# Patient Record
(demographics unavailable — no encounter records)

---

## 2024-10-23 NOTE — CARDIOLOGY SUMMARY
[de-identified] : 10/22/24 NSR, ant septal inf   [de-identified] : 04/10/22\par  Mitral Valve: Normal mitral valve. Minimal mitral\par  regurgitation.\par  Aortic Valve/Aorta: Aortic valve leaflet morphology not\par  well visualized.\par  Normal aortic root size. (Ao: 3.4 cm at the sinuses of\par  Valsalva).\par  Left Atrium: Normal left atrium.  LA volume index = 24\par  cc/m2.\par  Left Ventricle: Mild to moderate segmental left ventricular\par  systolic dysfunction.  Hypokinesis of the apex, distal\par  septum, and mid to distal anterior wall.  Endocardial\par  visualization enhanced with intravenous injection of\par  Ultrasonic Enhancing Agent (Definity).   No LV thrombus\par  seen. Normal left ventricular internal dimensions and wall\par  thicknesses. Moderate diastolic dysfunction (Stage II).\par  Right Heart: Normal right atrium. Unable to accurately\par  evaluate right ventricular size or systolic function.\par  Normal tricuspid valve. Minimal tricuspid regurgitation.\par  Normal pulmonic valve.\par  Pericardium/Pleura: Normal pericardium with no pericardial\par  effusion.\par  Hemodynamic: Estimated right atrial pressure is 8 mm Hg.\par   [de-identified] : 04/10/22\par  Coronary Angiography \par  The coronary circulation is right dominant. Cardiac catheterization\par  was performed emergently.\par  \par  LM \par  Left main artery: Angiography shows minor irregularities.  \par  \par  LAD \par  Left anterior descending artery: Angiography shows mild\par  atherosclerosis. First diagonal: There is a 95 % stenosis. This is the\par  culprit lesion. An intervention was performed. \par  \par  CX \par  Circumflex: Angiography shows minor irregularities.  \par  \par  RCA \par  Right coronary artery: Angiography shows mild atherosclerosis.  \par  \par  Left Heart Cath \par  Ejection fraction is 45%.  \par  \par

## 2024-10-23 NOTE — REVIEW OF SYSTEMS
[SOB] : shortness of breath [Dyspnea on exertion] : not dyspnea during exertion [Chest Discomfort] : no chest discomfort [Lower Ext Edema] : no extremity edema [Palpitations] : no palpitations [Syncope] : no syncope [Cough] : no cough [Wheezing] : no wheezing [Coughing Up Blood] : no hemoptysis [Abdominal Pain] : no abdominal pain [Nausea] : no nausea [Vomiting] : no vomiting [Diarrhea] : diarrhea [Hematuria] : no hematuria [Pain During Urination] : no dysuria [Easy Bruising] : a tendency for easy bruising [Negative] : Psychiatric

## 2024-10-23 NOTE — HISTORY OF PRESENT ILLNESS
[FreeTextEntry1] : 50 yo man with MI s/p RADHA 1st Diagonal 04/10/22, mild to moderate LV dysfunction, HLD  04/2022, prior smoker quit after MI who presents for a follow up.  In August 2023 underwent cardiac cath, which showed patent stent.  Did not take meds this am because he was rushing here.  Since the last visit underwent fistulotomy.  Has colonoscopy planned in the near future but not yet scheduled.    Soc Hx: + former smoker, + ETOH moderate user, no drug use, works as a dental tech  Fam Hx: mother 80 yo alive and well, father 72 yo CABG

## 2024-10-23 NOTE — PHYSICAL EXAM
[No Xanthelasma] : no xanthelasma [Soft] : abdomen soft [Non Tender] : non-tender [Normal Radial B/L] : normal radial B/L [Normal] : alert and oriented, normal memory

## 2024-10-23 NOTE — DISCUSSION/SUMMARY
[FreeTextEntry1] : 52 yo man with no prior medical history till admission at [facility] for MI s/p RADHA 1st Diagonal 04/10/22, mild to moderate LV dysfunction, HLD  04/2022, smoker recently quit who presents for a follow up.  Lateral MI s/p PCI 1st Diagonal 04/10/22 with mild to mod LV dysfunction -brilinta stopped after cath, cont ASA 81 mg po daily.    -Continue Toprol XL 25 mg and switched to Losartan at the last visit instead of Lisinopril.  BP uncontrolled today likely due to missing a dose this am.   -most recent TTE shows normal LV function 06/2023  -s/p recent cardiac cath with patent stent and non-obstructive CAD, + myocardial bridging   HLD  4/2022 -Continue Lipitor 80 mg.  LDL 48 02/2023.  Labs at the next visit.    Follow up with me in 6 months.  Extensive discussions regarding risk factor modifications, medication compliance.  I strongly encouraged the pt to see a PMD and have colonoscopy done.   I have spent 30 min on this encounter by seeing the pt, reviewing previous records, discussing plan of care and documenting this encounter.   [EKG obtained to assist in diagnosis and management of assessed problem(s)] : EKG obtained to assist in diagnosis and management of assessed problem(s)

## 2025-04-30 NOTE — HISTORY OF PRESENT ILLNESS
[FreeTextEntry1] : 52 yo man with MI s/p RADHA 1st Diagonal 04/10/22, mild to moderate LV dysfunction, HLD  04/2022, prior smoker quit after MI who presents for a follow up.  In August 2023 underwent cardiac cath, which showed patent stent.  Reports feeling well.    Soc Hx: + former smoker, + ETOH moderate user, no drug use, works as a dental tech  Fam Hx: mother 80 yo alive and well, father 70 yo CABG

## 2025-04-30 NOTE — DISCUSSION/SUMMARY
[FreeTextEntry1] : 50 yo man with no prior medical history till admission at [facility] for MI s/p RADHA 1st Diagonal 04/10/22, mild to moderate LV dysfunction, HLD  04/2022, former smoker who presents for a follow up.  Lateral MI s/p PCI 1st Diagonal 04/10/22 with mild to mod LV dysfunction -brilinta stopped after cath, cont ASA 81 mg po daily.    -Continue Toprol XL 25 mg and switched to Losartan previously instead of Lisinopril.  BP controlled today  -most recent TTE shows normal LV function 06/2023  -s/p recent cardiac cath with patent stent and non-obstructive CAD, + myocardial bridging   HLD  4/2022 -Continue Lipitor 80 mg.  LDL 48 02/2023.  Labs today    Follow up with me in 6 months.  Extensive discussions regarding risk factor modifications, medication compliance.  I strongly encouraged the pt to see a PMD, which he has not done.   I have spent 30 min on this encounter by seeing the pt, reviewing previous records, discussing plan of care and documenting this encounter.   [EKG obtained to assist in diagnosis and management of assessed problem(s)] : EKG obtained to assist in diagnosis and management of assessed problem(s)

## 2025-04-30 NOTE — CARDIOLOGY SUMMARY
[de-identified] : 4/22/24 NSR, ant septal inf   [de-identified] : 04/10/22\par  Mitral Valve: Normal mitral valve. Minimal mitral\par  regurgitation.\par  Aortic Valve/Aorta: Aortic valve leaflet morphology not\par  well visualized.\par  Normal aortic root size. (Ao: 3.4 cm at the sinuses of\par  Valsalva).\par  Left Atrium: Normal left atrium.  LA volume index = 24\par  cc/m2.\par  Left Ventricle: Mild to moderate segmental left ventricular\par  systolic dysfunction.  Hypokinesis of the apex, distal\par  septum, and mid to distal anterior wall.  Endocardial\par  visualization enhanced with intravenous injection of\par  Ultrasonic Enhancing Agent (Definity).   No LV thrombus\par  seen. Normal left ventricular internal dimensions and wall\par  thicknesses. Moderate diastolic dysfunction (Stage II).\par  Right Heart: Normal right atrium. Unable to accurately\par  evaluate right ventricular size or systolic function.\par  Normal tricuspid valve. Minimal tricuspid regurgitation.\par  Normal pulmonic valve.\par  Pericardium/Pleura: Normal pericardium with no pericardial\par  effusion.\par  Hemodynamic: Estimated right atrial pressure is 8 mm Hg.\par   [de-identified] : 04/10/22\par  Coronary Angiography \par  The coronary circulation is right dominant. Cardiac catheterization\par  was performed emergently.\par  \par  LM \par  Left main artery: Angiography shows minor irregularities.  \par  \par  LAD \par  Left anterior descending artery: Angiography shows mild\par  atherosclerosis. First diagonal: There is a 95 % stenosis. This is the\par  culprit lesion. An intervention was performed. \par  \par  CX \par  Circumflex: Angiography shows minor irregularities.  \par  \par  RCA \par  Right coronary artery: Angiography shows mild atherosclerosis.  \par  \par  Left Heart Cath \par  Ejection fraction is 45%.  \par  \par